# Patient Record
Sex: MALE | Race: BLACK OR AFRICAN AMERICAN | Employment: OTHER | URBAN - METROPOLITAN AREA
[De-identification: names, ages, dates, MRNs, and addresses within clinical notes are randomized per-mention and may not be internally consistent; named-entity substitution may affect disease eponyms.]

---

## 2017-03-21 RX ORDER — HYDROCHLOROTHIAZIDE 25 MG/1
TABLET ORAL
Qty: 30 TAB | Refills: 3 | Status: SHIPPED | OUTPATIENT
Start: 2017-03-21 | End: 2017-03-23 | Stop reason: SDUPTHER

## 2017-03-23 ENCOUNTER — OFFICE VISIT (OUTPATIENT)
Dept: FAMILY MEDICINE CLINIC | Age: 62
End: 2017-03-23

## 2017-03-23 VITALS
OXYGEN SATURATION: 96 % | SYSTOLIC BLOOD PRESSURE: 141 MMHG | BODY MASS INDEX: 24.89 KG/M2 | TEMPERATURE: 96 F | RESPIRATION RATE: 18 BRPM | WEIGHT: 177.8 LBS | HEART RATE: 86 BPM | HEIGHT: 71 IN | DIASTOLIC BLOOD PRESSURE: 80 MMHG

## 2017-03-23 DIAGNOSIS — I10 ESSENTIAL HYPERTENSION: ICD-10-CM

## 2017-03-23 DIAGNOSIS — J32.1 CHRONIC FRONTAL SINUSITIS: Primary | ICD-10-CM

## 2017-03-23 RX ORDER — SIMVASTATIN 10 MG/1
10 TABLET, FILM COATED ORAL
Qty: 90 TAB | Refills: 3 | Status: SHIPPED | OUTPATIENT
Start: 2017-03-23 | End: 2018-04-23 | Stop reason: SDUPTHER

## 2017-03-23 RX ORDER — AZITHROMYCIN 250 MG/1
TABLET, FILM COATED ORAL
Qty: 6 TAB | Refills: 0 | Status: SHIPPED | OUTPATIENT
Start: 2017-03-23 | End: 2017-03-28

## 2017-03-23 RX ORDER — ZONISAMIDE 100 MG/1
CAPSULE ORAL
Refills: 0 | COMMUNITY
Start: 2017-01-26 | End: 2020-11-06

## 2017-03-23 RX ORDER — HYDROCHLOROTHIAZIDE 25 MG/1
TABLET ORAL
Qty: 90 TAB | Refills: 3 | Status: SHIPPED | OUTPATIENT
Start: 2017-03-23 | End: 2018-04-23 | Stop reason: SDUPTHER

## 2017-03-23 NOTE — PROGRESS NOTES
HISTORY OF PRESENT ILLNESS  Jostin Merino is a 60 y.o. male. HPI Comments: Patient mentions he has been having some cough with a greenish expectoration and some nasal congestion. I have explained to him his symptoms come more from his sinuses  And I will prescribe him antibiotics. Nasal Congestion    The history is provided by the patient. This is a new problem. The current episode started more than 2 days ago. The problem has been gradually worsening. There has been no fever. The pain is at a severity of 3/10. The pain is mild. The pain has been constant since onset. Associated symptoms include congestion, cough, rhinorrhea and headaches. Pertinent negatives include no chills, no ear pain, no hoarse voice, no sinus pressure, no sore throat, no shortness of breath, no neck pain, no neck pain and no chest pain. He has tried nothing for the symptoms. Cough   The history is provided by the patient. This is a new problem. The current episode started more than 1 week ago. The problem occurs constantly. The problem has been gradually worsening. Associated symptoms include headaches. Pertinent negatives include no chest pain, no abdominal pain and no shortness of breath. The symptoms are aggravated by coughing and stress. Nothing relieves the symptoms. He has tried nothing for the symptoms. Hypertension    The history is provided by the patient. This is a chronic problem. The problem has been gradually worsening. Associated symptoms include headaches. Pertinent negatives include no chest pain, no palpitations, no malaise/fatigue, no blurred vision, no neck pain, no dizziness, no shortness of breath, no nausea and no vomiting. Risk factors include hypertension, stress, male gender and family history. Review of Systems   Constitutional: Negative for chills, fever and malaise/fatigue. HENT: Positive for congestion and rhinorrhea. Negative for ear pain, hoarse voice, sinus pressure and sore throat.     Eyes: Negative for blurred vision, double vision, pain and discharge. Respiratory: Positive for cough. Negative for sputum production and shortness of breath. Cardiovascular: Negative for chest pain, palpitations and leg swelling. Gastrointestinal: Negative for abdominal pain, nausea and vomiting. Genitourinary: Negative for dysuria. Musculoskeletal: Negative for neck pain. Neurological: Positive for headaches. Negative for dizziness, tingling, focal weakness and weakness. Endo/Heme/Allergies: Negative for environmental allergies. Psychiatric/Behavioral: The patient is not nervous/anxious. Visit Vitals    /80 (BP 1 Location: Right arm, BP Patient Position: Sitting)    Pulse 86    Temp 96 °F (35.6 °C) (Oral)    Resp 18    Ht 5' 11\" (1.803 m)    Wt 177 lb 12.8 oz (80.6 kg)    SpO2 96%    BMI 24.8 kg/m2       Physical Exam   Constitutional: He is oriented to person, place, and time. He appears well-developed and well-nourished. No distress. HENT:   Head: Normocephalic and atraumatic. Right Ear: External ear normal.   Left Ear: External ear normal.   Mouth/Throat: Posterior oropharyngeal erythema present. Eyes: Pupils are equal, round, and reactive to light. Neck: Normal range of motion. No thyromegaly present. Cardiovascular: Normal rate, regular rhythm and normal heart sounds. Pulmonary/Chest: Effort normal and breath sounds normal. No respiratory distress. He has no wheezes. Abdominal: Soft. Bowel sounds are normal. He exhibits no distension. There is no tenderness. Lymphadenopathy:     He has no cervical adenopathy. Neurological: He is alert and oriented to person, place, and time. Psychiatric: He has a normal mood and affect. ASSESSMENT and PLAN  Sinusitis :  1) Ok to take tylenol / motrin to relieve pain. 2) Please finish course of antibiotics , explained side effects and patient verbalizes understanding.   3) Mucolytic's such as guaifenesin which can thin out the mucous. 4) Use nasal steroid inhaler and anti-allergy medication. 5) Can use nasal saline spray or Neti-pot. 6) Please keep a humidifier in your bedroom. 7) Patient instructions and information on diagnosis to be handed out. Hypertension :  1) Goal blood pressure less than equal to 140/90 mmHg, goal BP can vary depending on risk factors as discussed. 2) Lifestyle modifications discussed with patient, low sodium <2 gm, low salt , DASH diet  3) Exercise for at least 30 min 3-5 times a week for goal BMI of less than or equal  To 25.  4) Continue current medications as prescribed. 5) Please begin medication as discussed for better blood pressure control, explained side effects and patient verbalized understanding. 6) Goal LDL<100.  7) Please monitor your blood pressure and keep a log to bring in with you at each visit. 8) Discussed risk factors with patient such as CAD, FAST of stroke symptoms, pt verbalizes understanding. 9) Please avoid smoking , alcohol and any illicit drug use if applicable to you.

## 2017-03-23 NOTE — PROGRESS NOTES
Shaunna Norman is a 60 y.o. male presents to office for congestion for about 2 weeks ago. 1. Have you been to the ER, urgent care clinic or hospitalized since your last visit? no  2. Have you seen any other providers outside of [facility] since your last visit? no  3. Have you had a Flu shot this year?  yes      Health Maintenance items with a due date reviewed with patient:  Health Maintenance Due   Topic Date Due    Hepatitis C Screening  1955    Pneumococcal 19-64 Medium Risk (1 of 1 - PPSV23) 01/06/1973    DTaP/Tdap/Td series (1 - Tdap) 01/06/1975    FOBT Q 1 YEAR AGE 50-75  01/06/2004    ZOSTER VACCINE AGE 60>  01/06/2014

## 2017-04-24 RX ORDER — BUDESONIDE AND FORMOTEROL FUMARATE DIHYDRATE 160; 4.5 UG/1; UG/1
AEROSOL RESPIRATORY (INHALATION)
Qty: 10.2 INHALER | Refills: 3 | Status: SHIPPED | OUTPATIENT
Start: 2017-04-24 | End: 2017-09-28 | Stop reason: SDUPTHER

## 2017-09-28 ENCOUNTER — OFFICE VISIT (OUTPATIENT)
Dept: FAMILY MEDICINE CLINIC | Age: 62
End: 2017-09-28

## 2017-09-28 VITALS
OXYGEN SATURATION: 95 % | HEART RATE: 90 BPM | RESPIRATION RATE: 18 BRPM | HEIGHT: 71 IN | WEIGHT: 182 LBS | BODY MASS INDEX: 25.48 KG/M2

## 2017-09-28 DIAGNOSIS — J42 CHRONIC BRONCHITIS, UNSPECIFIED CHRONIC BRONCHITIS TYPE (HCC): Primary | ICD-10-CM

## 2017-09-28 DIAGNOSIS — Z23 ENCOUNTER FOR IMMUNIZATION: ICD-10-CM

## 2017-09-28 DIAGNOSIS — E55.9 VITAMIN D DEFICIENCY: ICD-10-CM

## 2017-09-28 RX ORDER — BUDESONIDE AND FORMOTEROL FUMARATE DIHYDRATE 160; 4.5 UG/1; UG/1
2 AEROSOL RESPIRATORY (INHALATION) 2 TIMES DAILY
Qty: 10.2 INHALER | Refills: 3 | Status: SHIPPED | OUTPATIENT
Start: 2017-09-28

## 2017-09-28 RX ORDER — ALBUTEROL SULFATE 90 UG/1
2 AEROSOL, METERED RESPIRATORY (INHALATION)
Qty: 1 INHALER | Refills: 3 | Status: SHIPPED | OUTPATIENT
Start: 2017-09-28 | End: 2022-06-30

## 2017-09-28 NOTE — PROGRESS NOTES
Chief Complaint   Patient presents with    Other     patient wants to discuss breathing meds     1. Have you been to the ER, urgent care clinic since your last visit? Hospitalized since your last visit? No    2. Have you seen or consulted any other health care providers outside of the [address] [place] [address] since your last visit? Include any pap smears or colon screening.  No

## 2017-09-29 NOTE — PROGRESS NOTES
HISTORY OF PRESENT ILLNESS  Kaleb Barnes is a 60 y.o. male. HPI Comments: Patient is here today as he is concerned about hs breathing. He mentions for the last week he has been feeling more congested and he has noticed that while doing his routine activities like cutting grass he gets short of breathe. He has only been using his inhaler the Symbicort once a day rather than twice a day and he has not been using albuterol at all. I have explained to him proper regimen and he verbalizes an understanding. Patient would like the flu shot today. Breathing Problem   The history is provided by the patient. This is a recurrent problem. The problem occurs continuously. The current episode started more than 1 week ago. The problem has been gradually worsening. Pertinent negatives include no fever, no headaches, no rhinorrhea, no sore throat, no swollen glands, no ear pain, no cough, no sputum production, no hemoptysis, no wheezing, no chest pain, no syncope, no vomiting, no rash, no leg pain and no claudication. The problem's precipitants include pollens. He has tried inhaled steroids and beta-agonist inhalers for the symptoms. The treatment provided mild relief. He has had no prior hospitalizations. He has had prior ED visits. He has had no prior ICU admissions. Associated medical issues include COPD. Review of Systems   Constitutional: Negative for chills, fever, malaise/fatigue and weight loss. HENT: Negative for congestion, ear pain, hearing loss, rhinorrhea, sinus pain and sore throat. Eyes: Negative for blurred vision, double vision, pain and discharge. Respiratory: Positive for shortness of breath. Negative for cough, hemoptysis, sputum production, wheezing and stridor. Cardiovascular: Negative for chest pain, palpitations, claudication and syncope. Gastrointestinal: Negative for constipation, diarrhea, nausea and vomiting. Genitourinary: Negative for dysuria.    Musculoskeletal: Negative for joint pain. Skin: Negative for rash. Neurological: Negative for dizziness, focal weakness and headaches. Psychiatric/Behavioral: Positive for memory loss. Negative for depression. The patient is nervous/anxious. Visit Vitals    Pulse 90    Resp 18    Ht 5' 11\" (1.803 m)    Wt 182 lb (82.6 kg)    SpO2 95%    BMI 25.38 kg/m2       Physical Exam   Constitutional: He is oriented to person, place, and time. He appears well-developed and well-nourished. No distress. HENT:   Head: Normocephalic and atraumatic. Right Ear: External ear normal.   Left Ear: External ear normal.   Mouth/Throat: Oropharynx is clear and moist. No oropharyngeal exudate. Eyes: EOM are normal. Pupils are equal, round, and reactive to light. No scleral icterus. Neck: Normal range of motion. No thyromegaly present. Cardiovascular: Normal rate, regular rhythm and normal heart sounds. Pulmonary/Chest: Effort normal and breath sounds normal. No respiratory distress. He has no wheezes. Abdominal: Soft. Bowel sounds are normal. He exhibits no distension. There is no tenderness. Lymphadenopathy:     He has no cervical adenopathy. Neurological: He is alert and oriented to person, place, and time. Psychiatric: He has a normal mood and affect. ASSESSMENT and PLAN  Dyspnea :  - Spirometry every 1-2 years to assess airflow and monitor.    - Please continue to use inhalers as directed , albuterol acutely every 4-6 hours prn shortness of breath and please use your steroid inhaler twice a day as maintenance therapy.    -Avoid smoking and exposure to second hand smoke. -Please keep a humidifier in your home. .  -Immunization with the flu shot annually and pneumococcal vaccine    - Monitor for signs of increased shortness of breathe , increased sputum production , fever and chest pain, if you experience any of these symptoms please call the office ASAP or go to the emergency room.         - Referral to pulmonary if no improvement over next 6 months. - Comorbidities  have been discussed.    -Patient instructions and diagnosis information has been handed out.

## 2018-03-02 ENCOUNTER — OFFICE VISIT (OUTPATIENT)
Dept: FAMILY MEDICINE CLINIC | Age: 63
End: 2018-03-02

## 2018-03-02 VITALS
DIASTOLIC BLOOD PRESSURE: 90 MMHG | SYSTOLIC BLOOD PRESSURE: 172 MMHG | HEART RATE: 77 BPM | OXYGEN SATURATION: 100 % | WEIGHT: 182 LBS | TEMPERATURE: 98.5 F | BODY MASS INDEX: 25.48 KG/M2 | RESPIRATION RATE: 16 BRPM | HEIGHT: 71 IN

## 2018-03-02 DIAGNOSIS — G89.29 CHRONIC RIGHT SHOULDER PAIN: Primary | ICD-10-CM

## 2018-03-02 DIAGNOSIS — M25.511 CHRONIC RIGHT SHOULDER PAIN: Primary | ICD-10-CM

## 2018-03-02 NOTE — PROGRESS NOTES
Patient is here for shoulder pain. ..1. Have you been to the ER, urgent care clinic since your last visit? Hospitalized since your last visit?no    2. Have you seen or consulted any other health care providers outside of the [address] [place] [address] since your last visit? Include any pap smears or colon screening.  no

## 2018-03-02 NOTE — PROGRESS NOTES
HISTORY OF PRESENT ILLNESS  Hector Trent is a 58 y.o. male. Shoulder Pain    The history is provided by the patient. The right shoulder is affected. The pain is at a severity of 5/10. The pain is moderate. The pain has been constant since onset. The pain does not radiate. There is no history of shoulder injury. He has no other injuries. There is no history of shoulder surgery. He reports no foreign bodies present. Review of Systems   Constitutional: Positive for malaise/fatigue. Negative for chills and fever. HENT: Negative for congestion, ear pain, sinus pain and sore throat. Eyes: Negative for blurred vision, double vision, pain and discharge. Respiratory: Negative for cough, sputum production, shortness of breath and wheezing. Cardiovascular: Negative for chest pain and leg swelling. Gastrointestinal: Negative for abdominal pain, diarrhea, nausea and vomiting. Musculoskeletal: Positive for joint pain. Right shoulder pain   Neurological: Negative for dizziness, speech change, focal weakness, weakness and headaches. Psychiatric/Behavioral: Negative for depression. The patient is not nervous/anxious. Visit Vitals    /90    Pulse 77    Temp 98.5 °F (36.9 °C) (Oral)    Resp 16    Ht 5' 11\" (1.803 m)    Wt 182 lb (82.6 kg)    SpO2 100%    BMI 25.38 kg/m2       Physical Exam   Constitutional: He is oriented to person, place, and time. He appears well-developed and well-nourished. No distress. HENT:   Head: Normocephalic and atraumatic. Right Ear: External ear normal.   Left Ear: External ear normal.   Mouth/Throat: Oropharynx is clear and moist. No oropharyngeal exudate. Eyes: EOM are normal. Pupils are equal, round, and reactive to light. No scleral icterus. Neck: Normal range of motion. No thyromegaly present. Cardiovascular: Normal rate, regular rhythm and normal heart sounds.     Pulmonary/Chest: Effort normal and breath sounds normal. No respiratory distress. He has no wheezes. Abdominal: Soft. Bowel sounds are normal. He exhibits no distension. There is no tenderness. Musculoskeletal: He exhibits tenderness. Right shoulder: He exhibits tenderness, pain and spasm. Lymphadenopathy:     He has no cervical adenopathy. Neurological: He is alert and oriented to person, place, and time. Psychiatric: He has a normal mood and affect. ASSESSMENT and PLAN  Right shoulder pain :  1) Please continue current medications as prescribed for pain   2) Please maintain current level of activity , discussed RICE regimen. 3) Consider physical therapy for better range of motion. 4) Ok to apply ice or a cold pack for 10-20 min 3 times a day.   5) Consider x-ray if symptoms worsen

## 2018-03-08 ENCOUNTER — TELEPHONE (OUTPATIENT)
Dept: FAMILY MEDICINE CLINIC | Age: 63
End: 2018-03-08

## 2018-03-12 ENCOUNTER — TELEPHONE (OUTPATIENT)
Dept: FAMILY MEDICINE CLINIC | Age: 63
End: 2018-03-12

## 2018-03-12 NOTE — TELEPHONE ENCOUNTER
Pt received xray results & wondering what to do next. He stated he originally called on 3/9/18 & has not received a call back back. Please call ASAP.

## 2018-03-13 RX ORDER — DICLOFENAC SODIUM 10 MG/G
4 GEL TOPICAL 4 TIMES DAILY
Qty: 1 EACH | Refills: 0 | Status: SHIPPED | OUTPATIENT
Start: 2018-03-13 | End: 2018-03-19 | Stop reason: SDUPTHER

## 2018-04-23 RX ORDER — HYDROCHLOROTHIAZIDE 25 MG/1
TABLET ORAL
Qty: 90 TAB | Refills: 3 | Status: SHIPPED | OUTPATIENT
Start: 2018-04-23 | End: 2019-07-29 | Stop reason: SDUPTHER

## 2018-04-23 RX ORDER — SIMVASTATIN 10 MG/1
TABLET, FILM COATED ORAL
Qty: 90 TAB | Refills: 3 | Status: SHIPPED | OUTPATIENT
Start: 2018-04-23 | End: 2019-07-29 | Stop reason: SDUPTHER

## 2018-05-01 ENCOUNTER — OFFICE VISIT (OUTPATIENT)
Dept: FAMILY MEDICINE CLINIC | Age: 63
End: 2018-05-01

## 2018-05-01 VITALS
HEART RATE: 82 BPM | BODY MASS INDEX: 26.18 KG/M2 | OXYGEN SATURATION: 100 % | RESPIRATION RATE: 18 BRPM | DIASTOLIC BLOOD PRESSURE: 89 MMHG | WEIGHT: 187 LBS | SYSTOLIC BLOOD PRESSURE: 153 MMHG | HEIGHT: 71 IN | TEMPERATURE: 95.3 F

## 2018-05-01 DIAGNOSIS — M25.641 STIFFNESS OF RIGHT HAND JOINT: Primary | ICD-10-CM

## 2018-05-01 NOTE — PROGRESS NOTES
HISTORY OF PRESENT ILLNESS  Rhina Ardon is a 58 y.o. male. HPI Comments: Patient is here as his hand stiffness has been bothering him for the last few weeks. He mentions the stiffness starts in the am and slowly goes away as the day progresses but not fully. Five years back he has had similar pain in the left hand and wore a brace for 2 -3 weeks and the pain went away. I have asked him to purchase a brace for the right hand which he will do and wear this for one month. Hand Problem   The history is provided by the patient. This is a recurrent problem. The problem occurs constantly. The problem has been gradually worsening. Pertinent negatives include no chest pain, no abdominal pain, no headaches and no shortness of breath. Associated symptoms comments: Hand stiffness worse on the right hand than on left. Exacerbated by: hand movement / use. Nothing relieves the symptoms. He has tried nothing for the symptoms. Review of Systems   Constitutional: Negative for chills and fever. HENT: Negative for congestion. Eyes: Negative for blurred vision. Respiratory: Negative for cough and shortness of breath. Cardiovascular: Negative for chest pain and palpitations. Gastrointestinal: Negative for abdominal pain and nausea. Musculoskeletal: Positive for joint pain. Right hand pain / right shoulder pain    Neurological: Negative for tingling, focal weakness, weakness and headaches. Psychiatric/Behavioral: Negative for depression. The patient is not nervous/anxious. Visit Vitals    /89    Pulse 82    Temp 95.3 °F (35.2 °C) (Oral)    Resp 18    Ht 5' 11\" (1.803 m)    Wt 187 lb (84.8 kg)    SpO2 100%    BMI 26.08 kg/m2       Physical Exam   Constitutional: He is oriented to person, place, and time. He appears well-developed and well-nourished. No distress. HENT:   Head: Normocephalic and atraumatic.    Right Ear: External ear normal.   Left Ear: External ear normal. Mouth/Throat: Oropharynx is clear and moist. No oropharyngeal exudate. Eyes: EOM are normal. Pupils are equal, round, and reactive to light. No scleral icterus. Neck: Normal range of motion. No thyromegaly present. Cardiovascular: Normal rate, regular rhythm and normal heart sounds. Pulmonary/Chest: Effort normal and breath sounds normal. No respiratory distress. He has no wheezes. Abdominal: Soft. Bowel sounds are normal. He exhibits no distension. There is no tenderness. Lymphadenopathy:     He has no cervical adenopathy. Neurological: He is alert and oriented to person, place, and time. Psychiatric: He has a normal mood and affect.        ASSESSMENT and PLAN  Right hand stiffness:  - ok to use wrist brace on right hand  - exercise to prevent joint pain

## 2018-06-26 ENCOUNTER — OFFICE VISIT (OUTPATIENT)
Dept: FAMILY MEDICINE CLINIC | Age: 63
End: 2018-06-26

## 2018-06-26 VITALS
OXYGEN SATURATION: 100 % | TEMPERATURE: 97.1 F | SYSTOLIC BLOOD PRESSURE: 136 MMHG | BODY MASS INDEX: 24.36 KG/M2 | HEIGHT: 71 IN | RESPIRATION RATE: 18 BRPM | HEART RATE: 84 BPM | DIASTOLIC BLOOD PRESSURE: 96 MMHG | WEIGHT: 174 LBS

## 2018-06-26 DIAGNOSIS — Z71.89 ACP (ADVANCE CARE PLANNING): ICD-10-CM

## 2018-06-26 DIAGNOSIS — M79.641 RIGHT HAND PAIN: Primary | ICD-10-CM

## 2018-06-26 NOTE — PROGRESS NOTES
HISTORY OF PRESENT ILLNESS  Rita Ruby is a 58 y.o. male. HPI Comments: Patient saw a hand specialist and he had a injection in the right hand and mentions he feels worse after the injection. Patient is due to have a EMG study and mentions he  Will have this done next month. He has been wearing his wrist brace and will follow up with the hand specialist.    Hand Pain    The history is provided by the patient. This is a chronic problem. The problem occurs constantly. The problem has been gradually worsening. The pain is present in the right hand. The quality of the pain is described as aching and pounding. The pain is at a severity of 5/10. The pain is moderate. Associated symptoms include limited range of motion and stiffness. Pertinent negatives include no tingling. The symptoms are aggravated by movement. He has tried arthritis medications (steroid injection) for the symptoms. The treatment provided mild relief. There has been no history of extremity trauma. Review of Systems   Constitutional: Negative for chills, fever and malaise/fatigue. HENT: Negative for congestion, ear discharge, ear pain, hearing loss and sore throat. Eyes: Negative for blurred vision, double vision, pain and discharge. Respiratory: Negative for cough, sputum production, shortness of breath and wheezing. Cardiovascular: Negative for chest pain, palpitations and claudication. Gastrointestinal: Negative for diarrhea, nausea and vomiting. Genitourinary: Negative for dysuria and hematuria. Musculoskeletal: Positive for joint pain and stiffness. Negative for myalgias. Right hand pain / right shoulder pain    Neurological: Negative for dizziness, tingling, focal weakness, weakness and headaches. Endo/Heme/Allergies: Negative for environmental allergies. Psychiatric/Behavioral: Negative for depression. The patient is not nervous/anxious.       Visit Vitals    BP (!) 136/96    Pulse 84    Temp 97.1 °F (36.2 °C) (Oral)    Resp 18    Ht 5' 11\" (1.803 m)    Wt 174 lb (78.9 kg)    SpO2 100%    BMI 24.27 kg/m2       Physical Exam   Constitutional: He is oriented to person, place, and time. He appears well-developed and well-nourished. No distress. HENT:   Head: Normocephalic and atraumatic. Right Ear: External ear normal.   Left Ear: External ear normal.   Mouth/Throat: Oropharynx is clear and moist. No oropharyngeal exudate. Eyes: EOM are normal. Pupils are equal, round, and reactive to light. No scleral icterus. Neck: Normal range of motion. No thyromegaly present. Cardiovascular: Normal rate, regular rhythm and normal heart sounds. Pulmonary/Chest: Effort normal and breath sounds normal. No respiratory distress. He has no wheezes. Abdominal: Soft. Bowel sounds are normal. He exhibits no distension. There is no tenderness. Musculoskeletal: He exhibits tenderness. Right shoulder: He exhibits decreased range of motion and tenderness. Arms:  Lymphadenopathy:     He has no cervical adenopathy. Neurological: He is oriented to person, place, and time. Psychiatric: He has a normal mood and affect. ASSESSMENT and PLAN  Right hand pain :  Please follow up with hand specialist and wear hand brace  Please follow through with EMG study   Advanced Health care directives have  been discussed with patient and information has been handed out to them. Patient verbalizes understanding of  Assessment and Plan and is in agreement. Health maintenance has been discussed and reviewed with patient. Patient is aware of updates that are needed for health maintenance and will schedule an appointment to have them done.

## 2018-06-26 NOTE — ACP (ADVANCE CARE PLANNING)
Advance Care Planning (ACP) Provider Note - Comprehensive     Date of ACP Conversation: 06/26/18  Persons included in Conversation:  patient  Length of ACP Conversation in minutes:  Five minutes  Authorized Decision Maker (if patient is incapable of making informed decisions): This person is:  Patient himself. General ACP for ALL Patients with Decision Making Capacity:   Importance of advance care planning, including choosing a healthcare agent to communicate patient's healthcare decisions if patient lost the ability to make decisions, such as after a sudden illness or accident  Understanding of the healthcare agent role was assessed and information provided  Exploration of values, goals, and preferences if recovery is not expected, even with continued medical treatment in the event of: Imminent death  \"In these circumstances, what matters most to you? \"  Care focused more on comfort or quality of life. \"What, if any, treatments would you want to avoid? \" Patient is not sure at present        For Serious or Chronic Illness:  Understanding of medical condition    Understanding of CPR, goals and expected outcomes, benefits and burdens discussed. Information on CPR success rates provided (e.g. for CPR in hospital, survival to d/c at two weeks is 22%, for chronically ill or elderly/frail survival is less than 3%); Individual asked to communicate understanding of information in his/her own words.     Interventions Provided:  Recommended completion of Advance Directive form after review of ACP materials and conversation with prospective healthcare agent   Recommended communicating the plan and making copies for the healthcare agent, personal physician, and others as appropriate (e.g., health system)  Recommended review of completed ACP document annually or upon change in health status

## 2018-06-26 NOTE — PROGRESS NOTES
Patient is here for right arm pain. 1. Have you been to the ER, urgent care clinic since your last visit? Hospitalized since your last visit?no    2. Have you seen or consulted any other health care providers outside of the [address] [place] [address] since your last visit? Include any pap smears or colon screening.  no

## 2018-07-11 ENCOUNTER — TELEPHONE (OUTPATIENT)
Dept: FAMILY MEDICINE CLINIC | Age: 63
End: 2018-07-11

## 2018-07-11 RX ORDER — BUDESONIDE AND FORMOTEROL FUMARATE DIHYDRATE 160; 4.5 UG/1; UG/1
AEROSOL RESPIRATORY (INHALATION)
Qty: 1 INHALER | Refills: 0 | Status: SHIPPED | OUTPATIENT
Start: 2018-07-11 | End: 2018-10-27 | Stop reason: SDUPTHER

## 2018-07-11 NOTE — TELEPHONE ENCOUNTER
Pt requesting to speak with nurse [facility] regarding paperwork. States please call [phone] ASAP.

## 2018-07-12 NOTE — TELEPHONE ENCOUNTER
Spoke with patient, he asked questions about the advanced directive. He stated he will come in and make an appt.

## 2018-07-23 ENCOUNTER — TELEPHONE (OUTPATIENT)
Dept: FAMILY MEDICINE CLINIC | Age: 63
End: 2018-07-23

## 2018-07-23 ENCOUNTER — OFFICE VISIT (OUTPATIENT)
Dept: FAMILY MEDICINE CLINIC | Age: 63
End: 2018-07-23

## 2018-07-23 VITALS
BODY MASS INDEX: 24.78 KG/M2 | RESPIRATION RATE: 18 BRPM | HEART RATE: 75 BPM | HEIGHT: 71 IN | SYSTOLIC BLOOD PRESSURE: 144 MMHG | DIASTOLIC BLOOD PRESSURE: 86 MMHG | OXYGEN SATURATION: 98 % | WEIGHT: 177 LBS | TEMPERATURE: 98.6 F
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
## 2018-07-23 DIAGNOSIS — M79.641 RIGHT HAND PAIN: Primary | ICD-10-CM

## 2018-07-23 NOTE — TELEPHONE ENCOUNTER
Pt requesting lab for lyme disease.   States was bitten by a tick about 4 months ago States please call once order has been placed to have labs done

## 2018-07-24 ENCOUNTER — LAB ONLY (OUTPATIENT)
Dept: FAMILY MEDICINE CLINIC | Age: 63
End: 2018-07-24

## 2018-07-24 ENCOUNTER — HOSPITAL ENCOUNTER (OUTPATIENT)
Dept: LAB | Age: 63
Discharge: HOME | End: 2018-07-24
Payer: COMMERCIAL

## 2018-07-24 DIAGNOSIS — Z01.89 ROUTINE LAB DRAW: Primary | ICD-10-CM
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
## 2018-07-24 PROCEDURE — 86618 LYME DISEASE ANTIBODY: CPT | Performed by: FAMILY MEDICINE

## 2018-07-24 PROCEDURE — 86617 LYME DISEASE ANTIBODY: CPT | Performed by: FAMILY MEDICINE

## 2018-07-27 LAB
B BURGDOR IGG PATRN SER IB-IMP: NEGATIVE
B BURGDOR IGG+IGM SER-ACNC: 1.65 ISR (ref 0–0.9)
B BURGDOR IGM PATRN SER IB-IMP: NEGATIVE
B BURGDOR18KD IGG SER QL IB: NORMAL
B BURGDOR23KD IGG SER QL IB: NORMAL
B BURGDOR23KD IGM SER QL IB: NORMAL
B BURGDOR28KD IGG SER QL IB: NORMAL
B BURGDOR30KD IGG SER QL IB: NORMAL
B BURGDOR39KD IGG SER QL IB: NORMAL
B BURGDOR39KD IGM SER QL IB: NORMAL
B BURGDOR41KD IGG SER QL IB: NORMAL
B BURGDOR41KD IGM SER QL IB: NORMAL
B BURGDOR45KD IGG SER QL IB: NORMAL
B BURGDOR58KD IGG SER QL IB: NORMAL
B BURGDOR66KD IGG SER QL IB: NORMAL
B BURGDOR93KD IGG SER QL IB: NORMAL

## 2018-07-30 ENCOUNTER — TELEPHONE (OUTPATIENT)
Dept: FAMILY MEDICINE CLINIC | Age: 63
End: 2018-07-30

## 2018-07-30 NOTE — TELEPHONE ENCOUNTER
Received fax transmission from [address] [place] [address] Specialist of [address]. Sent to be signed, sent, and scanned.

## 2018-10-29 RX ORDER — BUDESONIDE AND FORMOTEROL FUMARATE DIHYDRATE 160; 4.5 UG/1; UG/1
AEROSOL RESPIRATORY (INHALATION)
Qty: 1 INHALER | Refills: 0 | Status: SHIPPED | OUTPATIENT
Start: 2018-10-29 | End: 2019-01-02 | Stop reason: SDUPTHER

## 2018-11-20 ENCOUNTER — OFFICE VISIT (OUTPATIENT)
Dept: FAMILY MEDICINE CLINIC | Age: 63
End: 2018-11-20

## 2018-11-20 ENCOUNTER — HOSPITAL ENCOUNTER (OUTPATIENT)
Dept: LAB | Age: 63
Discharge: HOME | End: 2018-11-20
Payer: COMMERCIAL

## 2018-11-20 VITALS
SYSTOLIC BLOOD PRESSURE: 128 MMHG | HEART RATE: 88 BPM | WEIGHT: 177 LBS | TEMPERATURE: 97.3 F | DIASTOLIC BLOOD PRESSURE: 77 MMHG | OXYGEN SATURATION: 99 % | BODY MASS INDEX: 24.78 KG/M2 | RESPIRATION RATE: 18 BRPM | HEIGHT: 71 IN

## 2018-11-20 DIAGNOSIS — R35.0 URINARY FREQUENCY: ICD-10-CM

## 2018-11-20 DIAGNOSIS — R53.82 CHRONIC FATIGUE: ICD-10-CM

## 2018-11-20 DIAGNOSIS — R35.0 URINARY FREQUENCY: Primary | ICD-10-CM

## 2018-11-20 DIAGNOSIS — R94.5 ABNORMAL LIVER FUNCTION: ICD-10-CM

## 2018-11-20 LAB
ALBUMIN SERPL-MCNC: 3.8 G/DL (ref 3.4–5)
ALBUMIN/GLOB SERPL: 0.9 {RATIO} (ref 0.8–1.7)
ALP SERPL-CCNC: 153 U/L (ref 45–117)
ALT SERPL-CCNC: 23 U/L (ref 16–61)
ANION GAP SERPL CALC-SCNC: 7 MMOL/L (ref 3–18)
APPEARANCE UR: CLEAR
AST SERPL-CCNC: 16 U/L (ref 15–37)
BASOPHILS # BLD: 0 K/UL (ref 0–0.1)
BASOPHILS NFR BLD: 1 % (ref 0–2)
BILIRUB SERPL-MCNC: 0.3 MG/DL (ref 0.2–1)
BILIRUB UR QL STRIP: NORMAL
BILIRUB UR QL: NEGATIVE
BUN SERPL-MCNC: 16 MG/DL (ref 7–18)
BUN/CREAT SERPL: 13 (ref 12–20)
CALCIUM SERPL-MCNC: 8.8 MG/DL (ref 8.5–10.1)
CHLORIDE SERPL-SCNC: 101 MMOL/L (ref 100–108)
CO2 SERPL-SCNC: 30 MMOL/L (ref 21–32)
COLOR UR: ABNORMAL
CREAT SERPL-MCNC: 1.19 MG/DL (ref 0.6–1.3)
DIFFERENTIAL METHOD BLD: NORMAL
EOSINOPHIL # BLD: 0.2 K/UL (ref 0–0.4)
EOSINOPHIL NFR BLD: 4 % (ref 0–5)
ERYTHROCYTE [DISTWIDTH] IN BLOOD BY AUTOMATED COUNT: 13.8 % (ref 11.6–14.5)
EST. AVERAGE GLUCOSE BLD GHB EST-MCNC: 126 MG/DL
FERRITIN SERPL-MCNC: 137 NG/ML (ref 8–388)
FOLATE SERPL-MCNC: 6.9 NG/ML (ref 3.1–17.5)
GLOBULIN SER CALC-MCNC: 4.3 G/DL (ref 2–4)
GLUCOSE SERPL-MCNC: 92 MG/DL (ref 74–99)
GLUCOSE UR STRIP.AUTO-MCNC: NEGATIVE MG/DL
GLUCOSE UR-MCNC: NEGATIVE MG/DL
HBA1C MFR BLD: 6 % (ref 4.2–5.6)
HCT VFR BLD AUTO: 46.2 % (ref 36–48)
HGB BLD-MCNC: 14.7 G/DL (ref 13–16)
HGB UR QL STRIP: NEGATIVE
IRON SATN MFR SERPL: 29 %
IRON SERPL-MCNC: 81 UG/DL (ref 50–175)
KETONES P FAST UR STRIP-MCNC: NEGATIVE MG/DL
KETONES UR QL STRIP.AUTO: ABNORMAL MG/DL
LEUKOCYTE ESTERASE UR QL STRIP.AUTO: NEGATIVE
LYMPHOCYTES # BLD: 1.7 K/UL (ref 0.9–3.6)
LYMPHOCYTES NFR BLD: 29 % (ref 21–52)
MCH RBC QN AUTO: 28.5 PG (ref 24–34)
MCHC RBC AUTO-ENTMCNC: 31.8 G/DL (ref 31–37)
MCV RBC AUTO: 89.7 FL (ref 74–97)
MONOCYTES # BLD: 0.5 K/UL (ref 0.05–1.2)
MONOCYTES NFR BLD: 9 % (ref 3–10)
NEUTS SEG # BLD: 3.4 K/UL (ref 1.8–8)
NEUTS SEG NFR BLD: 57 % (ref 40–73)
NITRITE UR QL STRIP.AUTO: NEGATIVE
PH UR STRIP: 5.5 [PH] (ref 5–8)
PH UR STRIP: 6 [PH] (ref 4.6–8)
PLATELET # BLD AUTO: 270 K/UL (ref 135–420)
PMV BLD AUTO: 9.9 FL (ref 9.2–11.8)
POTASSIUM SERPL-SCNC: 4.1 MMOL/L (ref 3.5–5.5)
PROT SERPL-MCNC: 8.1 G/DL (ref 6.4–8.2)
PROT UR QL STRIP: NEGATIVE
PROT UR STRIP-MCNC: NEGATIVE MG/DL
RBC # BLD AUTO: 5.15 M/UL (ref 4.7–5.5)
SODIUM SERPL-SCNC: 138 MMOL/L (ref 136–145)
SP GR UR REFRACTOMETRY: 1.03 (ref 1–1.03)
SP GR UR STRIP: 1.02 (ref 1–1.03)
TIBC SERPL-MCNC: 280 UG/DL (ref 250–450)
TSH SERPL DL<=0.05 MIU/L-ACNC: 1.1 UIU/ML (ref 0.36–3.74)
UA UROBILINOGEN AMB POC: NORMAL (ref 0.2–1)
URINALYSIS CLARITY POC: CLEAR
URINALYSIS COLOR POC: YELLOW
URINE BLOOD POC: NORMAL
URINE LEUKOCYTES POC: NEGATIVE
URINE NITRITES POC: NEGATIVE
UROBILINOGEN UR QL STRIP.AUTO: 0.2 EU/DL (ref 0.2–1)
VIT B12 SERPL-MCNC: 263 PG/ML (ref 211–911)
WBC # BLD AUTO: 5.8 K/UL (ref 4.6–13.2)

## 2018-11-20 PROCEDURE — 85025 COMPLETE CBC W/AUTO DIFF WBC: CPT

## 2018-11-20 PROCEDURE — 83540 ASSAY OF IRON: CPT

## 2018-11-20 PROCEDURE — 83036 HEMOGLOBIN GLYCOSYLATED A1C: CPT

## 2018-11-20 PROCEDURE — 82977 ASSAY OF GGT: CPT

## 2018-11-20 PROCEDURE — 81003 URINALYSIS AUTO W/O SCOPE: CPT

## 2018-11-20 PROCEDURE — 82652 VIT D 1 25-DIHYDROXY: CPT

## 2018-11-20 PROCEDURE — 82607 VITAMIN B-12: CPT

## 2018-11-20 PROCEDURE — 87086 URINE CULTURE/COLONY COUNT: CPT

## 2018-11-20 PROCEDURE — 82746 ASSAY OF FOLIC ACID SERUM: CPT

## 2018-11-20 PROCEDURE — 82728 ASSAY OF FERRITIN: CPT

## 2018-11-20 PROCEDURE — 80053 COMPREHEN METABOLIC PANEL: CPT

## 2018-11-20 PROCEDURE — 84443 ASSAY THYROID STIM HORMONE: CPT

## 2018-11-20 NOTE — PROGRESS NOTES
HISTORY OF PRESENT ILLNESS  Abraham Tucker is a 58 y.o. male here today with 1-2 weeks of urinary frequency and fatigue. Patient states he used to be able to go to the gym every morning, but has been feeling so tired that he doesn't have the energy to do that anymore. He reports that he feels fatigued in the afternoon, often needing naps. He states this is very unlike his usual self. Patient also reports urinary frequency. He states this has been going on for awhile. He denies fevers/chills, dysuria, hematuria, flank pain, abdominal pain, n/v/d. Review of Systems   Constitutional: Positive for malaise/fatigue. Negative for chills, diaphoresis, fever and weight loss. HENT: Negative for ear pain, sinus pain and sore throat. Eyes: Negative for blurred vision, double vision and photophobia. Respiratory: Negative for cough and shortness of breath. Cardiovascular: Negative for chest pain, claudication and leg swelling. Skin: Negative for itching and rash. Neurological: Negative for weakness. Physical Exam   Constitutional: He is oriented to person, place, and time. He appears well-developed and well-nourished. HENT:   Head: Normocephalic and atraumatic. Neck: Normal range of motion. Neck supple. Cardiovascular: Normal rate, regular rhythm, normal heart sounds and intact distal pulses. Pulmonary/Chest: Effort normal and breath sounds normal.   Abdominal: Bowel sounds are normal.   Neurological: He is alert and oriented to person, place, and time. Skin: Skin is warm and dry. Nursing note and vitals reviewed. Visit Vitals  /77   Pulse 88   Temp 97.3 °F (36.3 °C) (Oral)   Resp 18   Ht 5' 11\" (1.803 m)   Wt 177 lb (80.3 kg)   SpO2 99%   BMI 24.69 kg/m²         ASSESSMENT and PLAN  Diagnoses and all orders for this visit:    1. Urinary frequency/BPH       - Patient does have history of BPH.  Last PSA was:        Prostate Specific Ag   Date Value Ref Range Status   01/23/2018 0.99 0.00 - 4.00 ng/mL Final     Comment:     (Methodology: Roche ECLIA)              -     AMB POC URINALYSIS DIP STICK AUTO W/ MICRO  -     URINALYSIS W/ RFLX MICROSCOPIC; Future  -     CULTURE, URINE; Future  11/20/2018  9:27 PM - [facility]     Component Value Flag Ref Range Units Status   Color DARK YELLOW      Final   Appearance CLEAR      Final   Specific gravity 1.028   1.005 - 1.030   Final   pH (UA) 5.5   5.0 - 8.0   Final   Protein NEGATIVE    NEG mg/dL Final   Glucose NEGATIVE    NEG mg/dL Final   Ketone TRACE Abnormal   A  NEG mg/dL Final   Bilirubin NEGATIVE    NEG   Final   Blood NEGATIVE    NEG   Final   Urobilinogen 0.2   0.2 - 1.0 EU/dL Final   Nitrites NEGATIVE    NEG   Final   Leukocyte Esterase NEGATIVE    NEG   Final           2. Chronic fatigue  -     CBC WITH AUTOMATED DIFF; Future  -     FERRITIN; Future  -     HEMOGLOBIN A1C WITH EAG; Future  -     IRON PROFILE; Future  -     FOLATE; Future  -     METABOLIC PANEL, COMPREHENSIVE; Future  -     TSH 3RD GENERATION; Future  -     VITAMIN B12; Future  -     VITAMIN D, 1, 25 DIHYDROXY; Future  -     AMB POC GLUCOSE, QUANTITATIVE, BLOOD  Component Value Flag Ref Range Units Status   Glucose     mg/dL Final       3. Abnormal liver function  -     GGT; Future    Follow-up in 2 weeks to review lab results. In the mean time, increase fluid intake, focus on eating healthy, regular meals and can follow-up sooner if s/s worsen.

## 2018-11-21 ENCOUNTER — DOCUMENTATION ONLY (OUTPATIENT)
Dept: FAMILY MEDICINE CLINIC | Age: 63
End: 2018-11-21

## 2018-11-21 LAB — GLUCOSE POC: 101 MG/DL

## 2018-11-21 NOTE — PROGRESS NOTES
Spoke to Teresa at [facility] to request that GGT test be added to the specimen sent yesterday. States that she will take care of it and that it should not be a problem.

## 2018-11-22 LAB
1,25(OH)2D3 SERPL-MCNC: 43.9 PG/ML (ref 19.9–79.3)
BACTERIA SPEC CULT: NORMAL
GGT SERPL-CCNC: 58 IU/L (ref 0–65)
SERVICE CMNT-IMP: NORMAL

## 2018-11-26 ENCOUNTER — TELEPHONE (OUTPATIENT)
Dept: FAMILY MEDICINE CLINIC | Age: 63
End: 2018-11-26

## 2018-11-26 DIAGNOSIS — E78.5 HYPERLIPIDEMIA, UNSPECIFIED HYPERLIPIDEMIA TYPE: Primary | ICD-10-CM

## 2018-11-26 DIAGNOSIS — R79.89 ABNORMAL LIVER FUNCTION TEST: ICD-10-CM

## 2018-11-26 DIAGNOSIS — R74.8 ABNORMAL ALKALINE PHOSPHATASE TEST: Primary | ICD-10-CM

## 2018-11-26 NOTE — TELEPHONE ENCOUNTER
Spoke with patient regarding abnormal lab values. Explained that his hemoglobin a1c was mildly elevated and that he should decrease sugar and carbohydrate intake. He should also increase activity in order to prevent diabetes. Patient verbalizes understanding. Discussed mildly elevated alkaline phos levels. Explained that we need to do further lab work to evaluate this abnormality. Patient is to have fasting lab work done this week. Will discuss further results with him after labs have been completed.

## 2018-11-27 ENCOUNTER — TELEPHONE (OUTPATIENT)
Dept: FAMILY MEDICINE CLINIC | Age: 63
End: 2018-11-27

## 2018-11-27 ENCOUNTER — HOSPITAL ENCOUNTER (OUTPATIENT)
Dept: LAB | Age: 63
Discharge: HOME | End: 2018-11-27
Payer: COMMERCIAL

## 2018-11-27 LAB
CALCIUM SERPL-MCNC: 9.1 MG/DL (ref 8.5–10.1)
CHOLEST SERPL-MCNC: 218 MG/DL
HDLC SERPL-MCNC: 48 MG/DL (ref 40–60)
HDLC SERPL: 4.5 {RATIO} (ref 0–5)
LDLC SERPL CALC-MCNC: 107 MG/DL (ref 0–100)
LIPID PROFILE,FLP: ABNORMAL
PTH-INTACT SERPL-MCNC: 75.4 PG/ML (ref 18.4–88)
TRIGL SERPL-MCNC: 315 MG/DL (ref ?–150)
VLDLC SERPL CALC-MCNC: 63 MG/DL

## 2018-11-27 PROCEDURE — 36415 COLL VENOUS BLD VENIPUNCTURE: CPT

## 2018-11-27 PROCEDURE — 82977 ASSAY OF GGT: CPT

## 2018-11-27 PROCEDURE — 84155 ASSAY OF PROTEIN SERUM: CPT

## 2018-11-27 PROCEDURE — 83970 ASSAY OF PARATHORMONE: CPT

## 2018-11-27 PROCEDURE — 80061 LIPID PANEL: CPT

## 2018-11-27 NOTE — TELEPHONE ENCOUNTER
----- Message from Giovanny Devine PA-C sent at 11/26/2018 12:12 PM EST -----  Can you please let this patient know that he will need to go to a hospital/other facility to have his blood drawn because we don't do one of the labs here that I want done. He needs to be fasting for this lab as well. If we can find out where he wants to go, so we can fax it to them that would be great. [id number].

## 2018-11-27 NOTE — TELEPHONE ENCOUNTER
Patient informed that we cannot draw the ordered blood work in this office and suggested patient go to [facility] for blood work. After much explanation to patient, he agreed to go to [facility] for blood work.

## 2018-11-28 LAB
ALBUMIN SERPL ELPH-MCNC: 3.4 G/DL (ref 2.9–4.4)
ALBUMIN/GLOB SERPL: 0.9 {RATIO} (ref 0.7–1.7)
ALPHA1 GLOB SERPL ELPH-MCNC: 0.2 G/DL (ref 0–0.4)
ALPHA2 GLOB SERPL ELPH-MCNC: 1 G/DL (ref 0.4–1)
B-GLOBULIN SERPL ELPH-MCNC: 1.1 G/DL (ref 0.7–1.3)
GAMMA GLOB SERPL ELPH-MCNC: 1.6 G/DL (ref 0.4–1.8)
GGT SERPL-CCNC: 47 IU/L (ref 0–65)
GLOBULIN SER CALC-MCNC: 3.9 G/DL (ref 2.2–3.9)
M PROTEIN SERPL ELPH-MCNC: NORMAL G/DL
PROT SERPL-MCNC: 7.3 G/DL (ref 6–8.5)

## 2018-11-29 ENCOUNTER — TELEPHONE (OUTPATIENT)
Dept: FAMILY MEDICINE CLINIC | Age: 63
End: 2018-11-29

## 2018-11-29 NOTE — TELEPHONE ENCOUNTER
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 I don't believe this was a fasting lab, so he should have this repeated sometime in the next few months while he is fasting. Thanks so much!

## 2018-12-03 NOTE — TELEPHONE ENCOUNTER
Informed patient of labs patient stated he understood and glad that everything is ok.  Patient has an appt  12/4 for cough and is requesting a chest xray

## 2018-12-04 NOTE — TELEPHONE ENCOUNTER
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 This patient is seeing you on Thursday for a cough and the message was routed to me by accident. Apparently, he is requesting a CXR. Do you want to wait until you see him first or would you like me to order it ahead of his visit?

## 2018-12-06 ENCOUNTER — OFFICE VISIT (OUTPATIENT)
Dept: FAMILY MEDICINE CLINIC | Age: 63
End: 2018-12-06

## 2018-12-06 VITALS
HEIGHT: 71 IN | RESPIRATION RATE: 14 BRPM | WEIGHT: 179 LBS | TEMPERATURE: 95.8 F | DIASTOLIC BLOOD PRESSURE: 83 MMHG | BODY MASS INDEX: 25.06 KG/M2 | SYSTOLIC BLOOD PRESSURE: 128 MMHG | OXYGEN SATURATION: 97 % | HEART RATE: 71 BPM

## 2018-12-06 DIAGNOSIS — J42 CHRONIC BRONCHITIS, UNSPECIFIED CHRONIC BRONCHITIS TYPE (HCC): ICD-10-CM

## 2018-12-06 NOTE — PROGRESS NOTES
HISTORY OF PRESENT ILLNESS Bobby Suggs is a 58 y.o. male. Patient mentions he was having a cold and he took some over the counter medication. Patient mentions he coughed up mucus which had some blood in it , first the expectoration was brown then he noticed blood. He has not had any bloody mucus at present. He mentions he has been having cough with expectoration throughout the year and he does not take his inhaler every day. He would like to have a chest x-ray today. Cough The history is provided by the patient. This is a chronic problem. The problem occurs constantly. The problem has not changed since onset. Associated symptoms include chest pain and shortness of breath. Pertinent negatives include no abdominal pain. The symptoms are aggravated by coughing. The symptoms are relieved by medications. Treatments tried: inhalers. Review of Systems Constitutional: Positive for malaise/fatigue. Negative for chills and fever. HENT: Negative for congestion, ear pain, hearing loss, sinus pain, sore throat and tinnitus. Eyes: Negative for blurred vision, double vision, pain and discharge. Respiratory: Positive for cough, hemoptysis and shortness of breath. Cardiovascular: Positive for chest pain. Negative for palpitations and leg swelling. Gastrointestinal: Negative for abdominal pain, nausea and vomiting. Genitourinary: Negative for dysuria, frequency and hematuria. Musculoskeletal: Negative for joint pain and myalgias. Neurological: Negative for dizziness, focal weakness and weakness. Endo/Heme/Allergies: Negative for environmental allergies. Psychiatric/Behavioral: Negative for depression. The patient is not nervous/anxious. Visit Vitals /83 Pulse 71 Temp 95.8 °F (35.4 °C) (Oral) Resp 14 Ht 5' 11\" (1.803 m) Wt 179 lb (81.2 kg) SpO2 97% BMI 24.97 kg/m² Physical Exam  
Constitutional: He is oriented to person, place, and time.  He appears well-developed and well-nourished. No distress. HENT:  
Head: Normocephalic and atraumatic. Right Ear: External ear normal.  
Left Ear: External ear normal.  
Nose: Right sinus exhibits maxillary sinus tenderness and frontal sinus tenderness. Left sinus exhibits maxillary sinus tenderness and frontal sinus tenderness. Mouth/Throat: Posterior oropharyngeal erythema present. Eyes: EOM are normal. Pupils are equal, round, and reactive to light. No scleral icterus. Neck: Normal range of motion. No thyromegaly present. Cardiovascular: Normal rate, regular rhythm and normal heart sounds. Pulmonary/Chest: Effort normal and breath sounds normal. No respiratory distress. He has no wheezes. Abdominal: Soft. Bowel sounds are normal. He exhibits no distension. There is no tenderness. Lymphadenopathy:  
  He has no cervical adenopathy. Neurological: He is alert and oriented to person, place, and time. Psychiatric: He has a normal mood and affect. ASSESSMENT and PLAN Bronchitis: 
1) Post- bronchitic cough can last for up to 4-6 weeks. 2) Please make sure you drink plenty of fluids to prevent dehydration. 3) It is ok to use cough drops , over the counter cough suppressants and expectorants as discussed in office. 4) You can use tylenol and ibuprofen for fever and body aches. 5) Please make sure you get enough rest , ok to take vitamin C 1000 mg for 1-2 weeks. 6) Avoid smoking if applicable to you and exposure to smoke. 7) Keep a humidifier around you for heat and moisture to better aid the easier coughing up of expectorant. 8) Please use inhaler as instructed. 9) Complete course of antibiotics as prescribed today,explained side effects, patient verbalized understanding. 10) Chest x-ray today

## 2018-12-06 NOTE — PROGRESS NOTES
Patient is here for coughing up blood. 1. Have you been to the ER, urgent care clinic since your last visit? Hospitalized since your last visit?no 2. Have you seen or consulted any other health care providers outside of the [address] [place] [address] since your last visit? Include any pap smears or colon screening.  no

## 2018-12-07 ENCOUNTER — TELEPHONE (OUTPATIENT)
Dept: FAMILY MEDICINE CLINIC | Age: 63
End: 2018-12-07

## 2019-01-02 NOTE — TELEPHONE ENCOUNTER
Pt called and wants meds now, we have received request from [facility] Aid sent to Dr Katelynn King. He is very upset that this is required be the pharmacy. He asked to speak to nurse Savannah Purvis, who is unavailable at this time.

## 2019-01-03 RX ORDER — BUDESONIDE AND FORMOTEROL FUMARATE DIHYDRATE 160; 4.5 UG/1; UG/1
AEROSOL RESPIRATORY (INHALATION)
Qty: 1 INHALER | Refills: 0 | Status: SHIPPED | OUTPATIENT
Start: 2019-01-03 | End: 2019-03-19 | Stop reason: SDUPTHER

## 2019-01-23 ENCOUNTER — HOSPITAL ENCOUNTER (OUTPATIENT)
Dept: LAB | Age: 64
Discharge: HOME | End: 2019-01-23
Payer: MEDICARE

## 2019-01-23 ENCOUNTER — TELEPHONE (OUTPATIENT)
Dept: FAMILY MEDICINE CLINIC | Age: 64
End: 2019-01-23

## 2019-01-23 ENCOUNTER — OFFICE VISIT (OUTPATIENT)
Dept: FAMILY MEDICINE CLINIC | Age: 64
End: 2019-01-23

## 2019-01-23 VITALS
RESPIRATION RATE: 18 BRPM | DIASTOLIC BLOOD PRESSURE: 91 MMHG | TEMPERATURE: 97.5 F | SYSTOLIC BLOOD PRESSURE: 139 MMHG | HEIGHT: 71 IN | HEART RATE: 87 BPM | BODY MASS INDEX: 24.5 KG/M2 | OXYGEN SATURATION: 97 % | WEIGHT: 175 LBS

## 2019-01-23 DIAGNOSIS — Z13.0 SCREENING FOR ENDOCRINE, NUTRITIONAL, METABOLIC AND IMMUNITY DISORDER: ICD-10-CM

## 2019-01-23 DIAGNOSIS — Z13.29 SCREENING FOR ENDOCRINE, NUTRITIONAL, METABOLIC AND IMMUNITY DISORDER: ICD-10-CM

## 2019-01-23 DIAGNOSIS — Z12.5 SCREENING PSA (PROSTATE SPECIFIC ANTIGEN): ICD-10-CM

## 2019-01-23 DIAGNOSIS — Z12.12 SCREENING FOR COLORECTAL CANCER: ICD-10-CM

## 2019-01-23 DIAGNOSIS — Z91.199 MEDICALLY NONCOMPLIANT: ICD-10-CM

## 2019-01-23 DIAGNOSIS — Z00.00 PHYSICAL EXAM: Primary | ICD-10-CM

## 2019-01-23 DIAGNOSIS — Z13.228 SCREENING FOR ENDOCRINE, NUTRITIONAL, METABOLIC AND IMMUNITY DISORDER: ICD-10-CM

## 2019-01-23 DIAGNOSIS — Z12.11 SCREENING FOR COLORECTAL CANCER: ICD-10-CM

## 2019-01-23 DIAGNOSIS — J42 CHRONIC BRONCHITIS, UNSPECIFIED CHRONIC BRONCHITIS TYPE (HCC): ICD-10-CM

## 2019-01-23 DIAGNOSIS — Z13.21 SCREENING FOR ENDOCRINE, NUTRITIONAL, METABOLIC AND IMMUNITY DISORDER: ICD-10-CM

## 2019-01-23 DIAGNOSIS — I10 ESSENTIAL HYPERTENSION: ICD-10-CM

## 2019-01-23 DIAGNOSIS — Z11.59 NEED FOR HEPATITIS C SCREENING TEST: ICD-10-CM

## 2019-01-23 DIAGNOSIS — E78.5 HYPERLIPIDEMIA, UNSPECIFIED HYPERLIPIDEMIA TYPE: ICD-10-CM

## 2019-01-23 DIAGNOSIS — Z90.49 S/P PARTIAL RESECTION OF COLON: ICD-10-CM

## 2019-01-23 DIAGNOSIS — Z87.19 H/O DIVERTICULITIS OF COLON: ICD-10-CM

## 2019-01-23 LAB
ALBUMIN SERPL-MCNC: 3.6 G/DL (ref 3.4–5)
ALBUMIN/GLOB SERPL: 0.9 {RATIO} (ref 0.8–1.7)
ALP SERPL-CCNC: 127 U/L (ref 45–117)
ALT SERPL-CCNC: 16 U/L (ref 16–61)
ANION GAP SERPL CALC-SCNC: 7 MMOL/L (ref 3–18)
AST SERPL-CCNC: 12 U/L (ref 15–37)
BASOPHILS # BLD: 0 K/UL (ref 0–0.1)
BASOPHILS NFR BLD: 0 % (ref 0–2)
BILIRUB SERPL-MCNC: 0.2 MG/DL (ref 0.2–1)
BUN SERPL-MCNC: 16 MG/DL (ref 7–18)
BUN/CREAT SERPL: 14 (ref 12–20)
CALCIUM SERPL-MCNC: 8.9 MG/DL (ref 8.5–10.1)
CHLORIDE SERPL-SCNC: 100 MMOL/L (ref 100–108)
CHOLEST SERPL-MCNC: 231 MG/DL
CO2 SERPL-SCNC: 32 MMOL/L (ref 21–32)
CREAT SERPL-MCNC: 1.16 MG/DL (ref 0.6–1.3)
DIFFERENTIAL METHOD BLD: ABNORMAL
EOSINOPHIL # BLD: 0.2 K/UL (ref 0–0.4)
EOSINOPHIL NFR BLD: 4 % (ref 0–5)
ERYTHROCYTE [DISTWIDTH] IN BLOOD BY AUTOMATED COUNT: 15.1 % (ref 11.6–14.5)
GLOBULIN SER CALC-MCNC: 3.8 G/DL (ref 2–4)
GLUCOSE SERPL-MCNC: 105 MG/DL (ref 74–99)
HCT VFR BLD AUTO: 44.7 % (ref 36–48)
HDLC SERPL-MCNC: 54 MG/DL (ref 40–60)
HDLC SERPL: 4.3 {RATIO} (ref 0–5)
HGB BLD-MCNC: 14.1 G/DL (ref 13–16)
LDLC SERPL CALC-MCNC: 147.8 MG/DL (ref 0–100)
LIPID PROFILE,FLP: ABNORMAL
LYMPHOCYTES # BLD: 1.6 K/UL (ref 0.9–3.6)
LYMPHOCYTES NFR BLD: 33 % (ref 21–52)
MCH RBC QN AUTO: 29 PG (ref 24–34)
MCHC RBC AUTO-ENTMCNC: 31.5 G/DL (ref 31–37)
MCV RBC AUTO: 91.8 FL (ref 74–97)
MONOCYTES # BLD: 0.4 K/UL (ref 0.05–1.2)
MONOCYTES NFR BLD: 8 % (ref 3–10)
NEUTS SEG # BLD: 2.7 K/UL (ref 1.8–8)
NEUTS SEG NFR BLD: 55 % (ref 40–73)
PLATELET # BLD AUTO: 230 K/UL (ref 135–420)
PMV BLD AUTO: 10.4 FL (ref 9.2–11.8)
POTASSIUM SERPL-SCNC: 3.8 MMOL/L (ref 3.5–5.5)
PROT SERPL-MCNC: 7.4 G/DL (ref 6.4–8.2)
RBC # BLD AUTO: 4.87 M/UL (ref 4.7–5.5)
SODIUM SERPL-SCNC: 139 MMOL/L (ref 136–145)
TRIGL SERPL-MCNC: 146 MG/DL (ref ?–150)
TSH SERPL DL<=0.05 MIU/L-ACNC: 1.1 UIU/ML (ref 0.36–3.74)
VLDLC SERPL CALC-MCNC: 29.2 MG/DL
WBC # BLD AUTO: 5 K/UL (ref 4.6–13.2)

## 2019-01-23 PROCEDURE — 84443 ASSAY THYROID STIM HORMONE: CPT

## 2019-01-23 PROCEDURE — 80053 COMPREHEN METABOLIC PANEL: CPT

## 2019-01-23 PROCEDURE — 85025 COMPLETE CBC W/AUTO DIFF WBC: CPT

## 2019-01-23 PROCEDURE — 80061 LIPID PANEL: CPT

## 2019-01-23 PROCEDURE — 86803 HEPATITIS C AB TEST: CPT

## 2019-01-23 PROCEDURE — 84154 ASSAY OF PSA FREE: CPT

## 2019-01-23 NOTE — PROGRESS NOTES
HISTORY OF PRESENT ILLNESS Nazanin Anderson is a 71 y.o. male. Patient is here for physical exam. He is due to have some blood work done. He also mentions he has been having some bronchitis like symptoms which is recurrent. He mentions he has been using his inhalers  But continues to have excessive phlegm. I have advised him to use mucinex. Patient saw his urologist yesterday and he is confused today and is not sure what his visit was about but mentions he did have a prostate check. I have discussed he will need some blood work today. I have also discussed that in 2020 he will be due to have a colonoscopy and information for dominga has been given. Cold Symptoms The history is provided by the patient. This is a new problem. The current episode started 2 days ago. The problem occurs constantly. The problem has been gradually improving. The cough is productive of sputum. There has been no fever. Associated symptoms include ear congestion, ear pain, headaches, rhinorrhea and sore throat. Pertinent negatives include no chest pain, no chills, no sweats, no weight loss, no myalgias, no shortness of breath, no wheezing, no nausea, no vomiting and no confusion. He has tried inhalers for the symptoms. The treatment provided mild relief. His past medical history is significant for bronchitis and COPD. Complete Physical  
The history is provided by the patient. This is a new problem. The problem occurs constantly. The problem has not changed since onset. Associated symptoms include headaches. Pertinent negatives include no chest pain, no abdominal pain and no shortness of breath. Nothing aggravates the symptoms. Nothing relieves the symptoms. He has tried nothing for the symptoms. Review of Systems Constitutional: Negative for chills and weight loss. HENT: Positive for ear pain, rhinorrhea and sore throat. Negative for congestion, nosebleeds and sinus pain. Eyes: Negative for blurred vision, double vision, pain and discharge. Respiratory: Negative for cough, sputum production, shortness of breath and wheezing. Cardiovascular: Negative for chest pain, palpitations, claudication and leg swelling. Gastrointestinal: Negative for abdominal pain, blood in stool, constipation, diarrhea, nausea and vomiting. Genitourinary: Negative for dysuria, frequency and hematuria. Musculoskeletal: Negative for joint pain and myalgias. Neurological: Positive for headaches. Negative for dizziness, tingling and focal weakness. Psychiatric/Behavioral: Negative for confusion, hallucinations and suicidal ideas. The patient is nervous/anxious. Visit Vitals BP (!) 139/91 Pulse 87 Temp 97.5 °F (36.4 °C) (Oral) Resp 18 Ht 5' 11\" (1.803 m) Wt 175 lb (79.4 kg) SpO2 97% BMI 24.41 kg/m² Physical Exam  
Constitutional: He is oriented to person, place, and time. He appears well-developed and well-nourished. No distress. HENT:  
Head: Normocephalic and atraumatic. Right Ear: External ear normal.  
Left Ear: External ear normal.  
Mouth/Throat: Oropharynx is clear and moist. No oropharyngeal exudate. Eyes: EOM are normal. Pupils are equal, round, and reactive to light. No scleral icterus. Neck: Normal range of motion. No thyromegaly present. Cardiovascular: Normal rate, regular rhythm and normal heart sounds. Pulmonary/Chest: Effort normal and breath sounds normal. No respiratory distress. He has no wheezes. Abdominal: Soft. Bowel sounds are normal. He exhibits distension. There is no tenderness. Lymphadenopathy:  
  He has no cervical adenopathy. Neurological: He is alert and oriented to person, place, and time. Psychiatric: He has a normal mood and affect. ASSESSMENT and PLAN Physical exam : 
1) Please make sure you have a routine physical exam every 1-2 years.  
2) Annual check up with eye doctor and dentist. 
 3) Colorectal cancer screening with colonoscopy every ten years at age 48. Depending on certain risk factors screening may need to be done earlier. 4) Rectal exam and PSA screening at age of 48, screening may be done earlier depending on certain risk factors as discussed. ) Bone density testing starting at the age of 72.  
) Routine blood work to be ordered as part of physical exam and has been discussed with patient. ) Screening for STD's/HIV. ) Exercise at least 30 min 3-5 times a week for goal BMI of less than or equal to 25. Please make sure you wear a seat belt while driving daily , helmet safety discussed. ) Please avoid smoking , alcohol and illicit drug use. ) Daily requirement of calcium is 1200 mg per day and 1000 IU of vitamin D. ) Please make sure all immunizations are up to date: - Influenza vaccine every year  
     - Tdap every 10 years - Pneumococcal vaccine starting at age of 72 
     - Shingles at age 60 Blood work today Hypertension : 
1) Goal blood pressure less than equal to 140/90 mmHg, goal BP can vary depending on risk factors as discussed. 2) Lifestyle modifications discussed with patient, low sodium <2 gm, low salt , DASH diet 3) Exercise for at least 30 min 3-5 times a week for goal BMI of less than or equal  To 25. 
4) Continue current medications as prescribed. 5) Please begin medication as discussed for better blood pressure control, explained side effects and patient verbalized understanding. 6) Goal LDL<100. 
7) Please monitor your blood pressure and keep a log to bring in with you at each visit. 8) Discussed risk factors with patient such as CAD, FAST of stroke symptoms, pt verbalizes understanding. 9) Please avoid smoking , alcohol and any illicit drug use if applicable to you. Hyperlipidemia :1) Goal LDL less than or equal to 100 mg/dl , total cholesterol less than or equal to 200 mg/dl. 2) Goal blood pressure less than or equal to 140/90 mmHg. 3) Discussed low - cholesterol and low fat diet, Good Fats vs Bad Fats. 4) Exercise 30 min 3-5 times a week for goal BMI of less than or equal to 25. 
5) Continue current medication as prescribed for cholesterol control. 6) Explained the side effects of medication and patient verbalized understanding. 7) You may also begin fish oil 1000 mg 3-4 times a day for better cholesterol control. 8) You may also try red yeast rice for cholesterol control. 9) Please drink skim milk if you drink dairy products. 10) Please avoid smoking , alcohol , or any illicit drug use if applicable to you. 11) Regular cholesterol panel to be done every 6-12 months. 
12) Discussed attributing risk factors , patient verbalized understanding. Psa screening Discussed the need to follow up on colon cancer screening , information for doctor has been given and patient mentions he will make an apt. Chronic bronchitis with copd: - Spirometry every 1-2 years to assess airflow and monitor. 
 
- Please continue to use inhalers as directed , albuterol acutely every 4-6 hours prn shortness of breath and please use your steroid inhaler twice a day as maintenance therapy. 
 
-Avoid smoking and exposure to second hand smoke. -Please keep a humidifier in your home. 
 
-Continue daily use of oxygen as directed. . 
-Immunization with the flu shot annually and pneumococcal vaccine - Monitor for signs of increased shortness of breathe , increased sputum production , fever and chest pain, if you experience any of these symptoms please call the office ASAP or go to the emergency room. - Referral to pulmonary if no improvement over next 6 months. - Comorbidities  have been discussed. 
 
-Patient instructions and diagnosis information has been handed out.

## 2019-01-23 NOTE — PROGRESS NOTES
Chief Complaint Patient presents with  Cold Symptoms  Complete Physical  
 
.. 1. Have you been to the ER, urgent care clinic since your last visit? Hospitalized since your last visit? Yes Reason for visit: Urine issues 2. Have you seen or consulted any other health care providers outside of the [address] [place] [address] since your last visit? Include any pap smears or colon screening. Yes Where: Urology

## 2019-01-24 LAB
HCV AB SER IA-ACNC: 0.04 INDEX
HCV AB SERPL QL IA: NEGATIVE
HCV COMMENT,HCGAC: NORMAL
PSA FREE MFR SERPL: 29.1 %
PSA FREE SERPL-MCNC: 0.32 NG/ML
PSA SERPL-MCNC: 1.1 NG/ML (ref 0–4)

## 2019-01-29 ENCOUNTER — TELEPHONE (OUTPATIENT)
Dept: FAMILY MEDICINE CLINIC | Age: 64
End: 2019-01-29

## 2019-03-19 RX ORDER — BUDESONIDE AND FORMOTEROL FUMARATE DIHYDRATE 160; 4.5 UG/1; UG/1
AEROSOL RESPIRATORY (INHALATION)
Qty: 1 INHALER | Refills: 3 | Status: SHIPPED | OUTPATIENT
Start: 2019-03-19 | End: 2022-06-30

## 2019-06-13 ENCOUNTER — TELEPHONE (OUTPATIENT)
Dept: FAMILY MEDICINE CLINIC | Age: 64
End: 2019-06-13

## 2019-06-13 NOTE — TELEPHONE ENCOUNTER
Pt called to say his jaw popped today when he was eating an apple and has a little swelling around on side of face. He stated that this happened one day last week also and it's uncomfortable. May be a TMJ issue. Please call pt to intruct.

## 2019-06-14 ENCOUNTER — OFFICE VISIT (OUTPATIENT)
Dept: FAMILY MEDICINE CLINIC | Age: 64
End: 2019-06-14

## 2019-06-14 VITALS
BODY MASS INDEX: 24.78 KG/M2 | WEIGHT: 177 LBS | SYSTOLIC BLOOD PRESSURE: 145 MMHG | HEIGHT: 71 IN | OXYGEN SATURATION: 99 % | HEART RATE: 76 BPM | RESPIRATION RATE: 16 BRPM | TEMPERATURE: 98.6 F | DIASTOLIC BLOOD PRESSURE: 86 MMHG

## 2019-06-14 DIAGNOSIS — R22.0 JAW SWELLING: Primary | ICD-10-CM

## 2019-06-14 DIAGNOSIS — J42 CHRONIC BRONCHITIS, UNSPECIFIED CHRONIC BRONCHITIS TYPE (HCC): ICD-10-CM

## 2019-06-14 RX ORDER — FLUTICASONE FUROATE AND VILANTEROL 200; 25 UG/1; UG/1
1 POWDER RESPIRATORY (INHALATION) DAILY
Qty: 1 INHALER | Refills: 0 | Status: SHIPPED | OUTPATIENT
Start: 2019-06-14 | End: 2021-06-15 | Stop reason: ALTCHOICE

## 2019-06-14 NOTE — PROGRESS NOTES
HISTORY OF PRESENT ILLNESS  Denisse Jackson is a 71 y.o. male. Patient is here today as he may be having a swollen lymph node in front of the left ear. He mentions about 2 weeks ago he ate something and noticed his left side of jaw swollen and the same issue occurred yesterday. He mentions his swelling has gone down. He also mentions he has been coughing a lot of phlegm and the Symbicort costs a lot. I have advised him to try the breo- ellipta. Other   The history is provided by the patient. This is a new problem. The problem occurs every several days. The problem has not changed since onset. Associated symptoms include shortness of breath. Pertinent negatives include no chest pain, no abdominal pain and no headaches. The symptoms are aggravated by eating and stress. Nothing relieves the symptoms. He has tried nothing for the symptoms. Review of Systems   Constitutional: Negative for chills, fever and malaise/fatigue. HENT: Negative for congestion, ear pain, hearing loss, nosebleeds, sinus pain and sore throat. Swelling in front of the left ear   Eyes: Negative for blurred vision, double vision, pain and discharge. Respiratory: Positive for cough, sputum production and shortness of breath. Negative for wheezing. Cardiovascular: Negative for chest pain and leg swelling. Gastrointestinal: Negative for abdominal pain, blood in stool, constipation, nausea and vomiting. Genitourinary: Negative for dysuria, frequency and hematuria. Musculoskeletal: Negative for joint pain and myalgias. Neurological: Negative for dizziness, tingling, focal weakness, weakness and headaches. Endo/Heme/Allergies: Negative for environmental allergies. Psychiatric/Behavioral: Negative for depression. The patient is not nervous/anxious. Physical Exam   Constitutional: He is oriented to person, place, and time. He appears well-developed and well-nourished. No distress.    HENT:   Head: Normocephalic and atraumatic. Right Ear: External ear normal.   Left Ear: External ear normal.   Mouth/Throat: Posterior oropharyngeal erythema present. Left sided swelling in front of the left ear   Eyes: Pupils are equal, round, and reactive to light. EOM are normal. No scleral icterus. Neck: Normal range of motion. No thyromegaly present. Cardiovascular: Normal rate, regular rhythm and normal heart sounds. Pulmonary/Chest: Effort normal and breath sounds normal. No respiratory distress. He has no wheezes. Abdominal: Soft. Bowel sounds are normal. He exhibits no distension. There is no tenderness. Lymphadenopathy:     He has no cervical adenopathy. Neurological: He is alert and oriented to person, place, and time. Psychiatric: He has a normal mood and affect. ASSESSMENT and PLAN  Left sided swelling in front  Of the left ear :  - swelling has decreased and he is feel better    COPD:  - Spirometry every 1-2 years to assess airflow and monitor.    - Please continue to use inhalers as directed , albuterol acutely every 4-6 hours prn shortness of breath and please use your steroid inhaler twice a day as maintenance therapy.    -Avoid smoking and exposure to second hand smoke. -Please keep a humidifier in your home.    -Continue daily use of oxygen as directed. .  -Immunization with the flu shot annually and pneumococcal vaccine    - Monitor for signs of increased shortness of breathe , increased sputum production , fever and chest pain, if you experience any of these symptoms please call the office ASAP or go to the emergency room.

## 2019-06-14 NOTE — PROGRESS NOTES
Patient is here he thinks \" he has mumps\"    1. Have you been to the ER, urgent care clinic since your last visit? Hospitalized since your last visit?no    2. Have you seen or consulted any other health care providers outside of the [address] [place] [address] since your last visit? Include any pap smears or colon screening.  no

## 2019-07-29 RX ORDER — SIMVASTATIN 10 MG/1
TABLET, FILM COATED ORAL
Qty: 90 TAB | Refills: 3 | Status: SHIPPED | OUTPATIENT
Start: 2019-07-29

## 2019-07-29 RX ORDER — HYDROCHLOROTHIAZIDE 25 MG/1
TABLET ORAL
Qty: 90 TAB | Refills: 3 | Status: SHIPPED | OUTPATIENT
Start: 2019-07-29

## 2019-09-24 ENCOUNTER — OFFICE VISIT (OUTPATIENT)
Dept: FAMILY MEDICINE CLINIC | Age: 64
End: 2019-09-24

## 2019-09-24 VITALS
HEIGHT: 71 IN | RESPIRATION RATE: 18 BRPM | DIASTOLIC BLOOD PRESSURE: 95 MMHG | HEART RATE: 91 BPM | OXYGEN SATURATION: 99 % | SYSTOLIC BLOOD PRESSURE: 154 MMHG | BODY MASS INDEX: 24.78 KG/M2 | TEMPERATURE: 98 F | WEIGHT: 177 LBS

## 2019-09-24 DIAGNOSIS — R10.9 ACUTE LEFT FLANK PAIN: ICD-10-CM

## 2019-09-24 DIAGNOSIS — Z00.00 MEDICARE ANNUAL WELLNESS VISIT, SUBSEQUENT: ICD-10-CM

## 2019-09-24 DIAGNOSIS — M25.50 CHRONIC PAIN OF MULTIPLE JOINTS: ICD-10-CM

## 2019-09-24 DIAGNOSIS — G89.29 CHRONIC PAIN OF MULTIPLE JOINTS: ICD-10-CM

## 2019-09-24 DIAGNOSIS — I10 ESSENTIAL HYPERTENSION: Primary | ICD-10-CM

## 2019-09-24 LAB
BILIRUB UR QL STRIP: NORMAL
GLUCOSE UR-MCNC: NEGATIVE MG/DL
KETONES P FAST UR STRIP-MCNC: NEGATIVE MG/DL
PH UR STRIP: 5.5 [PH] (ref 4.6–8)
PROT UR QL STRIP: NEGATIVE
SP GR UR STRIP: 1.03 (ref 1–1.03)
UA UROBILINOGEN AMB POC: NORMAL (ref 0.2–1)
URINALYSIS CLARITY POC: CLEAR
URINALYSIS COLOR POC: YELLOW
URINE BLOOD POC: NEGATIVE
URINE LEUKOCYTES POC: NEGATIVE
URINE NITRITES POC: NEGATIVE

## 2019-09-24 NOTE — PROGRESS NOTES
HISTORY OF PRESENT ILLNESS  Jj Machado is a 71 y.o. male. Patient is here for hypertension follow up. He has been taking his medications. He mentions he has been having more pain in the left side and is concerned in regards to his kidneys. Point of care urine dipstick is normal.    Hypertension    The history is provided by the patient. This is a chronic problem. The problem has not changed since onset. Associated symptoms include anxiety. Pertinent negatives include no chest pain, no orthopnea, no malaise/fatigue, no blurred vision, no headaches, no tinnitus, no neck pain, no peripheral edema, no dizziness, no shortness of breath, no nausea and no vomiting. Risk factors include hypertension and stress. Joint Pain    The history is provided by the patient. This is a recurrent problem. The problem occurs constantly. The problem has not changed since onset. Pain location: lower back pain  The quality of the pain is described as aching and sharp. The pain is at a severity of 6/10. The pain is moderate. Associated symptoms include limited range of motion and stiffness. Pertinent negatives include no tingling and no neck pain. He has tried nothing for the symptoms. There has been no history of extremity trauma. Annual Exam   The history is provided by the patient. This is a new problem. The problem occurs constantly. The problem has not changed since onset. Pertinent negatives include no chest pain, no abdominal pain, no headaches and no shortness of breath. Review of Systems   Constitutional: Negative for chills and malaise/fatigue. HENT: Negative for congestion, ear pain, sinus pain, sore throat and tinnitus. Eyes: Negative for blurred vision, double vision, pain, discharge and redness. Respiratory: Negative for cough, sputum production and shortness of breath. Cardiovascular: Negative for chest pain, orthopnea and leg swelling.    Gastrointestinal: Negative for abdominal pain, constipation, diarrhea, nausea and vomiting. Genitourinary: Negative for dysuria, frequency and hematuria. Musculoskeletal: Positive for joint pain and stiffness. Negative for neck pain. Neurological: Negative for dizziness, tingling, seizures, weakness and headaches. Endo/Heme/Allergies: Negative for environmental allergies. Psychiatric/Behavioral: Negative for depression. The patient is nervous/anxious. Visit Vitals  BP (!) 154/95   Pulse 91   Temp 98 °F (36.7 °C) (Oral)   Resp 18   Ht 5' 11\" (1.803 m)   Wt 177 lb (80.3 kg)   SpO2 99%   BMI 24.69 kg/m²     Physical Exam   Constitutional: He is oriented to person, place, and time and well-developed, well-nourished, and in no distress. No distress. HENT:   Head: Normocephalic and atraumatic. Right Ear: External ear normal.   Left Ear: External ear normal.   Mouth/Throat: Oropharynx is clear and moist. No oropharyngeal exudate. Eyes: Pupils are equal, round, and reactive to light. EOM are normal. No scleral icterus. Neck: Normal range of motion. No thyromegaly present. Cardiovascular: Normal rate, regular rhythm and normal heart sounds. Pulmonary/Chest: Effort normal and breath sounds normal. No respiratory distress. He has no wheezes. Abdominal: Soft. Bowel sounds are normal. He exhibits no distension. There is no tenderness. Musculoskeletal: He exhibits tenderness. Lower back pain    Lymphadenopathy:     He has no cervical adenopathy. Neurological: He is alert and oriented to person, place, and time. Psychiatric: Affect normal.         ASSESSMENT and PLAN  Left sided pain :  - discussed point of care dipstick  - Please stop heavy weight lifting and aggressive exercise for a few days to allow healing to damaged nerve roots but it is important to maintain exercises to build strength and flexibility. 2) Consider physical therapy for 6-8 weeks before exploring further treatment options. 3) Ok to use hot /cold packs depending on your body.   4) Discussed the use of pain medication and muscle relaxants , please do not take more than the reccommended amount prescribed. 5) Please have x-ray of the lower back done if your pain gets worse     Hypertension :  1) Goal blood pressure less than equal to 140/90 mmHg, goal BP can vary depending on risk factors as discussed. 2) Lifestyle modifications discussed with patient, low sodium <2 gm, low salt , DASH diet  3) Exercise for at least 30 min 3-5 times a week for goal BMI of less than or equal  To 25.  4) Continue current medications as prescribed. 5) Please begin medication as discussed for better blood pressure control, explained side effects and patient verbalized understanding. 6) Goal LDL<100.  7) Please monitor your blood pressure and keep a log to bring in with you at each visit. 8) Discussed risk factors with patient such as CAD, FAST of stroke symptoms, pt verbalizes understanding. 9) Please avoid smoking , alcohol and any illicit drug use if applicable to you. 10) Discussed lifestyle modifications ,dietary control and BP monitoring at home        I  This is the Subsequent Medicare Annual Wellness Exam, performed 12 months or more after the Initial AWV or the last Subsequent AWV    I have reviewed the patient's medical history in detail and updated the computerized patient record.      History     Past Medical History:   Diagnosis Date    BPH (benign prostatic hypertrophy)     COPD (chronic obstructive pulmonary disease) (HCC)     Dementia     Diverticula of colon     ED (erectile dysfunction) of organic origin     Erectile dysfunction     Esophageal reflux     Hyperlipidemia     Hypertension     Microscopic hematuria     Prostate nodule     Stroke [facility])       Past Surgical History:   Procedure Laterality Date    HX OTHER SURGICAL  10/2014    HX OTHER SURGICAL  04/03/2015    ENTEROSTOMY CLOSURE WITH RESECTION LAPAROSCOPIC     Current Outpatient Medications   Medication Sig Dispense Refill    hydroCHLOROthiazide (HYDRODIURIL) 25 mg tablet take 1 tablet by mouth once daily 90 Tab 3    simvastatin (ZOCOR) 10 mg tablet take 1 tablet by mouth at bedtime 90 Tab 3    fluticasone furoate-vilanterol (BREO ELLIPTA) 200-25 mcg/dose inhaler Take 1 Puff by inhalation daily. 1 Inhaler 0    SYMBICORT 160-4.5 mcg/actuation HFAA inhale 2 puffs by mouth twice a day 1 Inhaler 3    tri mix compound 1 mL by IntraCAVernosal route as needed. DISPENSE COMPOUNDED TRI-MIX (PGE-1  5 mcg/ Papaverine 40 mg/ Phentolamine 6 mg/ml). 5 mL 5    diclofenac (VOLTAREN) 1 % gel Apply 4 g to affected area four (4) times daily. 1 Each 0    Syringe with Needle, Disp, (ALLERGY SYRINGE) 1 mL 27 x 1/2\" syrg FOR USE WITH INTRACAVERNOSAL INJECTIONS. 25 Pen Needle 2    OTHER,NON-FORMULARY, DISPENSE COMPOUNDED BI-MIX (30 MG PAPAVERINE/ 4 MG PHENTOLAMINE/ML). 5 Each 5    albuterol (VENTOLIN HFA) 90 mcg/actuation inhaler Take 2 Puffs by inhalation every four (4) hours as needed for Wheezing or Shortness of Breath. 1 Inhaler 3    budesonide-formoterol (SYMBICORT) 160-4.5 mcg/actuation HFAA Take 2 Puffs by inhalation two (2) times a day. 10.2 Inhaler 3    zonisamide (ZONEGRAN) 100 mg capsule   0    Syringe with Needle, Disp, (ALLERGY SYRINGE 1CC/27GX1/2\") 1 mL 27 syrg 1 mL by IntraCAVernosal route as needed. 25 Each 3    aspirin delayed-release 325 mg tablet Take  by mouth every six (6) hours as needed for Pain. Allergies   Allergen Reactions    Hay Fever And Allergy Relief Itching and Cough     Family History   Problem Relation Age of Onset    Heart Failure Mother      Social History     Tobacco Use    Smoking status: Never Smoker    Smokeless tobacco: Never Used   Substance Use Topics    Alcohol use:  Yes     Alcohol/week: 6.0 standard drinks     Types: 6 Standard drinks or equivalent per week     Comment: occ     Patient Active Problem List   Diagnosis Code    Erectile dysfunction N52.9    Prostate nodule N40.2    BPH (benign prostatic hypertrophy) N40.0    Hypertension I10    Hyperlipidemia E78.5    COPD (chronic obstructive pulmonary disease) ([facility]) J44.9    Transient cerebral ischemia G45.9       Depression Risk Factor Screening:     3 most recent PHQ Screens 1/23/2019   Little interest or pleasure in doing things Several days   Feeling down, depressed, irritable, or hopeless Several days   Total Score PHQ 2 2     Alcohol Risk Factor Screening:   Patient mentions he does not drink   }  Functional Ability and Level of Safety:   Hearing Loss  {Good hearing on whisper testing , 2 feel behind the patient. Activities of Daily Living  The home contains: no safety equipment. Patient does total self care    Fall Risk  Fall Risk Assessment, last 12 mths 1/23/2019   Able to walk? Yes   Fall in past 12 months? No       Abuse Screen  Patient is not abused    Cognitive Screening   Evaluation of Cognitive Function:  Has your family/caregiver stated any concerns about your memory: no  Normal    Patient Care Team   Patient Care Team:  Jose Daniel Rubalcava MD as PCP - General (Internal Medicine)  Carleen Lujan MD (Inactive) (Internal Medicine)    Assessment/Plan   Education and counseling provided:  Are appropriate based on today's review and evaluation  End-of-Life planning (with patient's consent)  Pneumococcal Vaccine  Influenza Vaccine  Prostate cancer screening tests (PSA, covered annually)  Colorectal cancer screening tests  Cardiovascular screening blood test  Bone mass measurement (DEXA)  Screening for glaucoma    Diagnoses and all orders for this visit:    1. Essential hypertension    2. Chronic pain of multiple joints  -     AMB POC URINALYSIS DIP STICK AUTO W/ MICRO    3.  Acute left flank pain  -     AMB POC URINALYSIS DIP STICK AUTO W/ MICRO        Health Maintenance Due   Topic Date Due    MEDICARE YEARLY EXAM  01/23/2019    COLONOSCOPY  03/19/2020     Subsequent medicare wellness visit :  1) Please make sure you have a routine physical exam every 1-2 years. 2) Annual check up with eye doctor and dentist.  3) Colorectal cancer screening with colonoscopy every ten years at age 48. Depending on certain risk factors screening may need to be done earlier. 4) Rectal exam and PSA screening at age of 48, screening may be done earlier depending on certain risk factors as discussed.    ) Bone density testing starting at the age of 72.   ) Routine blood work to be ordered as part of physical exam and has been discussed with patient.  ) Screening for STD's/HIV.  ) Exercise at least 30 min 3-5 times a week for goal BMI of less than or equal to 25.    Please make sure you wear a seat belt while driving daily , helmet safety discussed.  ) Please avoid smoking , alcohol and illicit drug use.  ) Daily requirement of calcium is 1200 mg per day and 1000 IU of vitamin D.  ) Please make sure all immunizations are up to date:       - Influenza vaccine every year        - Tdap every 10 years       - Pneumococcal vaccine starting at age of 72       - Shingles at age 61

## 2019-09-25 NOTE — PATIENT INSTRUCTIONS
Medicare Wellness Visit, Male The best way to live healthy is to have a lifestyle where you eat a well-balanced diet, exercise regularly, limit alcohol use, and quit all forms of tobacco/nicotine, if applicable. Regular preventive services are another way to keep healthy. Preventive services (vaccines, screening tests, monitoring & exams) can help personalize your care plan, which helps you manage your own care. Screening tests can find health problems at the earliest stages, when they are easiest to treat. [address] [facility] follows the current, evidence-based guidelines published by the [facility] Tommy Maude ([facility]) when recommending preventive services for our patients. Because we follow these guidelines, sometimes recommendations change over time as research supports it. (For example, a prostate screening blood test is no longer routinely recommended for men with no symptoms.) Of course, you and your doctor may decide to screen more often for some diseases, based on your risk and co-morbidities (chronic disease you are already diagnosed with). Preventive services for you include: - Medicare offers their members a free annual wellness visit, which is time for you and your primary care provider to discuss and plan for your preventive service needs. Take advantage of this benefit every year! 
-All adults over age 72 should receive the recommended pneumonia vaccines. Current USPSTF guidelines recommend a series of two vaccines for the best pneumonia protection.  
-All adults should have a flu vaccine yearly and an ECG.  All adults age 61 and older should receive a shingles vaccine once in their lifetime.   
-All adults age 38-68 who are overweight should have a diabetes screening test once every three years.  
-Other screening tests & preventive services for persons with diabetes include: an eye exam to screen for diabetic retinopathy, a kidney function test, a foot exam, and stricter control over your cholesterol.  
-Cardiovascular screening for adults with routine risk involves an electrocardiogram (ECG) at intervals determined by the provider.  
-Colorectal cancer screening should be done for adults age 54-65 with no increased risk factors for colorectal cancer. There are a number of acceptable methods of screening for this type of cancer. Each test has its own benefits and drawbacks. Discuss with your provider what is most appropriate for you during your annual wellness visit. The different tests include: colonoscopy (considered the best screening method), a fecal occult blood test, a fecal DNA test, and sigmoidoscopy. 
-All adults born between [facility] should be screened once for Hepatitis C. 
-An Abdominal Aortic Aneurysm (AAA) Screening is recommended for men age 73-68 who has ever smoked in their lifetime. Here is a list of your current Health Maintenance items (your personalized list of preventive services) with a due date: 
Health Maintenance Due Topic Date Due  
 Annual Well Visit  01/23/2019  Colonoscopy  03/19/2020 Medicare Wellness Visit, Male The best way to live healthy is to have a lifestyle where you eat a well-balanced diet, exercise regularly, limit alcohol use, and quit all forms of tobacco/nicotine, if applicable. Regular preventive services are another way to keep healthy. Preventive services (vaccines, screening tests, monitoring & exams) can help personalize your care plan, which helps you manage your own care. Screening tests can find health problems at the earliest stages, when they are easiest to treat. [facility] follows the current, evidence-based guidelines published by the [facility] Tommy Santoro ([facility]) when recommending preventive services for our patients.  Because we follow these guidelines, sometimes recommendations change over time as research supports it. (For example, a prostate screening blood test is no longer routinely recommended for men with no symptoms.) Of course, you and your doctor may decide to screen more often for some diseases, based on your risk and co-morbidities (chronic disease you are already diagnosed with). Preventive services for you include: - Medicare offers their members a free annual wellness visit, which is time for you and your primary care provider to discuss and plan for your preventive service needs. Take advantage of this benefit every year! 
-All adults over age 72 should receive the recommended pneumonia vaccines. Current USPSTF guidelines recommend a series of two vaccines for the best pneumonia protection.  
-All adults should have a flu vaccine yearly and an ECG. All adults age 61 and older should receive a shingles vaccine once in their lifetime.   
-All adults age 38-68 who are overweight should have a diabetes screening test once every three years.  
-Other screening tests & preventive services for persons with diabetes include: an eye exam to screen for diabetic retinopathy, a kidney function test, a foot exam, and stricter control over your cholesterol.  
-Cardiovascular screening for adults with routine risk involves an electrocardiogram (ECG) at intervals determined by the provider.  
-Colorectal cancer screening should be done for adults age 54-65 with no increased risk factors for colorectal cancer. There are a number of acceptable methods of screening for this type of cancer. Each test has its own benefits and drawbacks. Discuss with your provider what is most appropriate for you during your annual wellness visit.  The different tests include: colonoscopy (considered the best screening method), a fecal occult blood test, a fecal DNA test, and sigmoidoscopy. 
-All adults born between [facility] should be screened once for Hepatitis C. 
 -An Abdominal Aortic Aneurysm (AAA) Screening is recommended for men age 73-68 who has ever smoked in their lifetime. Here is a list of your current Health Maintenance items (your personalized list of preventive services) with a due date: 
Health Maintenance Due Topic Date Due  
 Annual Well Visit  01/23/2019  Colonoscopy  03/19/2020

## 2020-01-27 PROBLEM — K57.32 DIVERTICULITIS OF COLON: Status: ACTIVE | Noted: 2020-01-27

## 2024-01-12 NOTE — PROGRESS NOTES
HISTORY OF PRESENT ILLNESS  Jagjit Beard is a 58 y.o. male. HPI Comments: Patient mentions he saw his hand doctor last week and prior to that he had EMG study done. He was told he does not have CTS in the right hand by the technician. Patient mentions he was told by his hand specialist that he does not have arthritis in the right hand. Patient was told to go through physical therapy for six weeks. Patient mentions that he does not want to go through this physical therapy due to the cost but his first session is today. Patient mentions he has not been to work since his hand has been bothering him. Hand Injury    The history is provided by the patient. This is a chronic problem. The problem occurs constantly. The problem has been gradually worsening. The pain is present in the right hand. The quality of the pain is described as aching and pounding. The pain is at a severity of 5/10. The pain is moderate. Associated symptoms include limited range of motion and stiffness. The symptoms are aggravated by movement and palpation. He has tried nothing for the symptoms. There has been no history of extremity trauma. Review of Systems   Constitutional: Negative for chills, fever and malaise/fatigue. HENT: Negative for congestion, hearing loss, nosebleeds, sinus pain and sore throat. Eyes: Negative for blurred vision and double vision. Respiratory: Negative for cough, hemoptysis and wheezing. Cardiovascular: Negative for chest pain, palpitations, claudication and leg swelling. Gastrointestinal: Negative for blood in stool, constipation, nausea and vomiting. Genitourinary: Negative for dysuria and frequency. Musculoskeletal: Positive for joint pain and stiffness. Negative for myalgias. Right hand pain   Neurological: Negative for dizziness, focal weakness, weakness and headaches. Psychiatric/Behavioral: The patient is not nervous/anxious.       Visit Vitals    /86    Pulse 75    Temp 98.6 °F (37 °C) (Oral)    Resp 18    Ht 5' 11\" (1.803 m)    Wt 177 lb (80.3 kg)    SpO2 98%    BMI 24.69 kg/m2       Physical Exam   Constitutional: He is oriented to person, place, and time. He appears well-developed and well-nourished. No distress. HENT:   Head: Normocephalic and atraumatic. Right Ear: External ear normal.   Left Ear: External ear normal.   Mouth/Throat: Oropharynx is clear and moist. No oropharyngeal exudate. Eyes: EOM are normal. Pupils are equal, round, and reactive to light. No scleral icterus. Neck: Normal range of motion. No thyromegaly present. Cardiovascular: Normal rate, regular rhythm and normal heart sounds. Pulmonary/Chest: Effort normal and breath sounds normal. No respiratory distress. He has no wheezes. Abdominal: Soft. Bowel sounds are normal. He exhibits no distension. There is no tenderness. Musculoskeletal: He exhibits tenderness. Right wrist: He exhibits decreased range of motion and tenderness. Lymphadenopathy:     He has no cervical adenopathy. Neurological: He is alert and oriented to person, place, and time. Psychiatric: He has a normal mood and affect.        ASSESSMENT and PLAN  Right hand pain :  - Please begin physical therapy N/A